# Patient Record
Sex: FEMALE | Race: WHITE | NOT HISPANIC OR LATINO
[De-identification: names, ages, dates, MRNs, and addresses within clinical notes are randomized per-mention and may not be internally consistent; named-entity substitution may affect disease eponyms.]

---

## 2017-12-20 ENCOUNTER — APPOINTMENT (OUTPATIENT)
Dept: ORTHOPEDIC SURGERY | Facility: CLINIC | Age: 77
End: 2017-12-20

## 2018-01-10 ENCOUNTER — APPOINTMENT (OUTPATIENT)
Dept: ORTHOPEDIC SURGERY | Facility: CLINIC | Age: 78
End: 2018-01-10

## 2018-02-28 ENCOUNTER — APPOINTMENT (OUTPATIENT)
Dept: ORTHOPEDIC SURGERY | Facility: CLINIC | Age: 78
End: 2018-02-28

## 2018-11-14 ENCOUNTER — RESULT REVIEW (OUTPATIENT)
Age: 78
End: 2018-11-14

## 2020-12-10 ENCOUNTER — RESULT REVIEW (OUTPATIENT)
Age: 80
End: 2020-12-10

## 2020-12-24 ENCOUNTER — RESULT REVIEW (OUTPATIENT)
Age: 80
End: 2020-12-24

## 2021-01-07 ENCOUNTER — RESULT REVIEW (OUTPATIENT)
Age: 81
End: 2021-01-07

## 2021-02-04 ENCOUNTER — RESULT REVIEW (OUTPATIENT)
Age: 81
End: 2021-02-04

## 2021-11-15 ENCOUNTER — RESULT REVIEW (OUTPATIENT)
Age: 81
End: 2021-11-15

## 2023-01-24 ENCOUNTER — EMERGENCY (EMERGENCY)
Facility: HOSPITAL | Age: 83
LOS: 1 days | Discharge: HOME | End: 2023-01-24
Admitting: EMERGENCY MEDICINE
Payer: MEDICARE

## 2023-01-24 VITALS
DIASTOLIC BLOOD PRESSURE: 76 MMHG | SYSTOLIC BLOOD PRESSURE: 111 MMHG | TEMPERATURE: 98 F | HEART RATE: 80 BPM | RESPIRATION RATE: 16 BRPM | OXYGEN SATURATION: 100 %

## 2023-01-24 PROCEDURE — 73564 X-RAY EXAM KNEE 4 OR MORE: CPT | Mod: 26,LT

## 2023-01-24 PROCEDURE — 99285 EMERGENCY DEPT VISIT HI MDM: CPT | Mod: 25

## 2023-01-24 PROCEDURE — 70450 CT HEAD/BRAIN W/O DYE: CPT | Mod: 26,MA

## 2023-01-24 PROCEDURE — 70486 CT MAXILLOFACIAL W/O DYE: CPT | Mod: 26,MA

## 2023-01-24 PROCEDURE — 12011 RPR F/E/E/N/L/M 2.5 CM/<: CPT

## 2023-01-24 PROCEDURE — 73700 CT LOWER EXTREMITY W/O DYE: CPT | Mod: 26,LT,MA

## 2023-01-24 RX ORDER — TETANUS TOXOID, REDUCED DIPHTHERIA TOXOID AND ACELLULAR PERTUSSIS VACCINE, ADSORBED 5; 2.5; 8; 8; 2.5 [IU]/.5ML; [IU]/.5ML; UG/.5ML; UG/.5ML; UG/.5ML
0.5 SUSPENSION INTRAMUSCULAR ONCE
Refills: 0 | Status: COMPLETED | OUTPATIENT
Start: 2023-01-24 | End: 2023-01-24

## 2023-01-24 RX ADMIN — TETANUS TOXOID, REDUCED DIPHTHERIA TOXOID AND ACELLULAR PERTUSSIS VACCINE, ADSORBED 0.5 MILLILITER(S): 5; 2.5; 8; 8; 2.5 SUSPENSION INTRAMUSCULAR at 12:38

## 2023-01-24 NOTE — ED PROVIDER NOTE - CLINICAL SUMMARY MEDICAL DECISION MAKING FREE TEXT BOX
81 y/o female pmh htn, no blood thinners c/o mechanical trip and fall w/ facial trauma. + 2cm lac to R forehead, minimal tendernes sto R eyebrow. able to ambulate, no hip or pelvis tenderness. WIll obtain CT head/max face, L knee xray, lac repair, reassess 81 y/o female pmh htn, no blood thinners c/o mechanical trip and fall w/ facial trauma. + 2cm lac to R forehead, minimal tendernes sto R eyebrow. able to ambulate, no hip or pelvis tenderness. WIll obtain CT head/max face, L knee xray, lac repair, reassess    Please see progress note for update.

## 2023-01-24 NOTE — CHART NOTE - NSCHARTNOTEFT_GEN_A_CORE
SW made aware patient has been cleared for d/c and needs assistance with transportation. Patient doesn't have medicaid for taxi, SW outreached SW management to possibly order patient a taxi as she doesn't have anybody else to pick her up and doesn't know how to utilize the bus line. SW to remain available and set up taxi if approved.

## 2023-01-24 NOTE — ED PROVIDER NOTE - PROGRESS NOTE DETAILS
PA Adler- Knee xray was suspicious for patellar fracture. CT preformed which shows intraarticular patellar fracture. Spoke with ortho, only requires bulky hamlin and and knee immobilizer and weight bearing as tolerated. Pt can f/u with Dr. Goldstein next week. stable for MS.

## 2023-01-24 NOTE — ED PROVIDER NOTE - CARE PROVIDER_API CALL
Rogelio Goldstein (MD)  Orthopaedic Surgery  [address]  [place] [address]  Phone: [phone]  Fax: [phone]  Follow Up Time:

## 2023-01-24 NOTE — ED PROVIDER NOTE - PATIENT PORTAL LINK FT
You can access the FollowMyHealth Patient Portal offered by [facility] by registering at the following website: [email or web address]. By joining [employer] [facility]’s FollowMyHealth portal, you will also be able to view your health information using other applications (apps) compatible with our system.

## 2023-01-24 NOTE — ED ADULT TRIAGE NOTE - CHIEF COMPLAINT QUOTE
Pt brought in by EMS after a trip and fall hitting her head on the sidewalk. Pt noted to have lac to stacy. Pt denies use of blood thinners.

## 2023-01-24 NOTE — ED PROVIDER NOTE - NSICDXPASTMEDICALHX_GEN_ALL_CORE_FT
PAST MEDICAL HISTORY:  Abdominal mass     Diabetes mellitus type 2  daily fingerstic range 89- 124 mg/dl    H/O: HTN (hypertension)     Hx of hypercholesterolemia

## 2023-01-24 NOTE — ED PROVIDER NOTE - OBJECTIVE STATEMENT
81 y/o female pmh htn c/o mechanical trip and fall x today. Pt tripped on uneven sidewalk and struck her face and L knee on the ground. Pt denies LOC. Pt now c/o L knee pain and laceration to R forehead. Pt denies chest pain, sob, abd pain, n/v/d, numbness, tingling, weakness, dizziness, syncope, fever or chills. Pt is not up to date on tetanus. Denies blood thinner usage

## 2023-02-06 ENCOUNTER — APPOINTMENT (OUTPATIENT)
Dept: ORTHOPEDIC SURGERY | Facility: CLINIC | Age: 83
End: 2023-02-06
Payer: MEDICARE

## 2023-02-06 VITALS — WEIGHT: 172 LBS | BODY MASS INDEX: 27.32 KG/M2 | HEIGHT: 66.5 IN

## 2023-02-06 DIAGNOSIS — M79.18 MYALGIA, OTHER SITE: ICD-10-CM

## 2023-02-06 PROCEDURE — 73564 X-RAY EXAM KNEE 4 OR MORE: CPT | Mod: LT

## 2023-02-06 PROCEDURE — 99204 OFFICE O/P NEW MOD 45 MIN: CPT | Mod: 25

## 2023-02-06 PROCEDURE — 27520 TREAT KNEECAP FRACTURE: CPT

## 2023-02-06 PROCEDURE — L1833: CPT | Mod: LT

## 2023-02-06 NOTE — HISTORY OF PRESENT ILLNESS
[de-identified] : 82 year old female  (RHD, retired )  left knee pain since  1/23/23 while walking tripped and fell went to ER \par The pain is located  anterior, deep \par The pain is associated with  swelling, clicking \par Worse with activity and better at rest.\par Has tried ice, Tylenol \par

## 2023-02-06 NOTE — ASSESSMENT
[FreeTextEntry1] : notes from er reviewed\par xrays reprot  reviewed from er showed quest fx\par Imaging was reviewed and independently interpreted - CT scan left knee 1/24/23 -  minimally disaplced patella fx\par \par Left X-Ray Examination of the KNEE (4 views): nondisplacec patella fx, degenerate changes.\par \par \par \par - We discussed their diagnosis and treatment options at length including the risks and benefits of both surgical treatment with a knee replacement and non-surgical options.\par - given that she can and the risk of surgery we decided will undergo nonop fx care in brace\par - brace locked in ext\par - The patient was advised to modify their activities..\par - Follow up in 6-8 weeks as to re-evaluate with xray for healing and consdier advance activtiy with PT\par \par \par

## 2023-02-06 NOTE — IMAGING
[de-identified] : \par LEFT KNEE\par Inspection:   effusion, \par Palpation: medial joint line tenderness, anterior tenderness over patella\par Knee Range of Motion:  3-125 \par Strength: 5/5 Quadriceps strength, 5/5 Hamstring strength\par Neurological: light touch is intact throughout\par Ligament Stability and Special Tests: \par Able to SLR\par McMurrays: neg\par Lachman: neg\par Pivot Shift: neg\par Posterior Drawer: neg\par Valgus: neg\par Varus: neg\par Patella Apprehension: neg\par Patella Maltracking: neg\par

## 2023-03-16 PROBLEM — S82.002A CLOSED NONDISPLACED FRACTURE OF LEFT PATELLA, UNSPECIFIED FRACTURE MORPHOLOGY, INITIAL ENCOUNTER: Status: ACTIVE | Noted: 2023-02-06

## 2023-03-16 PROBLEM — M17.12 ARTHRITIS OF KNEE, LEFT: Status: ACTIVE | Noted: 2023-02-06

## 2023-03-20 ENCOUNTER — APPOINTMENT (OUTPATIENT)
Dept: ORTHOPEDIC SURGERY | Facility: CLINIC | Age: 83
End: 2023-03-20
Payer: MEDICARE

## 2023-03-20 VITALS — HEIGHT: 66 IN | WEIGHT: 174 LBS | BODY MASS INDEX: 27.97 KG/M2

## 2023-03-20 DIAGNOSIS — M17.12 UNILATERAL PRIMARY OSTEOARTHRITIS, LEFT KNEE: ICD-10-CM

## 2023-03-20 DIAGNOSIS — S82.002A UNSPECIFIED FRACTURE OF LEFT PATELLA, INITIAL ENCOUNTER FOR CLOSED FRACTURE: ICD-10-CM

## 2023-03-20 PROCEDURE — 73560 X-RAY EXAM OF KNEE 1 OR 2: CPT | Mod: LT

## 2023-03-20 PROCEDURE — 99024 POSTOP FOLLOW-UP VISIT: CPT

## 2023-03-20 NOTE — IMAGING
[de-identified] : \par LEFT KNEE\par Inspection:   effusion, \par Palpation: medial joint line tenderness\par Knee Range of Motion:  3-125 \par Strength: 5/5 Quadriceps strength, 5/5 Hamstring strength\par Neurological: light touch is intact throughout\par Ligament Stability and Special Tests: \par Able to SLR\par McMurrays: neg\par Lachman: neg\par Pivot Shift: neg\par Posterior Drawer: neg\par Valgus: neg\par Varus: neg\par Patella Apprehension: neg\par Patella Maltracking: neg\par

## 2023-03-20 NOTE — HISTORY OF PRESENT ILLNESS
[de-identified] : 82 year old female  (RHD, retired )  left knee pain since  1/23/23 while walking tripped and fell went to ER \par The pain is located  anterior, deep \par The pain is associated with  swelling, clicking \par Worse with activity and better at rest.\par Has tried ice, Tylenol \par  \par 3/20/23 - using brace and mod activity, pain improving\par

## 2023-03-20 NOTE — ASSESSMENT
[FreeTextEntry1] : Left X-Ray Examination of the KNEE (2 views):  patella fx with healing, degenerate changes.\par \par \par \par - We discussed their diagnosis and treatment options at length including the risks and benefits of both surgical treatment with a knee replacement and non-surgical options.\par - We will continue conservative treatment with PT, icing, and anti-inflammatory medications.\par - The patient was provided with a prescription for Physical Therapy.\par - The patient was advised to let pain guide the gradual advancement of activities.\par - if arthtiis ascts up in furutre after 8 weesk of PT can consider injs for oa\par \par \par

## 2024-04-10 NOTE — ED PROCEDURE NOTE - PROCEDURE DATE TIME, MLM
EGD RECALL    LAST EGD 3/12/21 IN [facility]    ALLERGIC TO LATEX      LIST OF MEDICATIONS      amphetamine-dextroamphetamine (ADDERALL) 10 MG tablet  buPROPion (WELLBUTRIN) 100 MG tablet  estradiol (VIVELLE-DOT) 0.1 MG/24HR patch  Estradiol 10 MCG insert  hydrOXYzine (ATARAX) 10 MG tablet  irbesartan-hydrochlorothiazide (AVALIDE) 300-12.5 MG tablet  ketoconazole (NIZORAL) 2 % shampoo  lamoTRIgine  MG tablet sustained-release 24 hour  levothyroxine (SYNTHROID, LEVOTHROID) 112 MCG tablet  liothyronine (CYTOMEL) 5 MCG tablet  LORazepam (ATIVAN) 0.5 MG tablet  lurasidone (LATUDA) 40 MG tablet tablet  meloxicam (MOBIC) 15 MG tablet  ondansetron (ZOFRAN) 8 MG tablet  pantoprazole (PROTONIX) 40 MG EC tablet  Vibegron (Gemtesa) 75 MG tablet  zolpidem (AMBIEN) 10 MG tablet      
24-Jan-2023 15:58

## 2025-07-23 NOTE — ED PROVIDER NOTE - THROAT, MLM
On Dyazide 1 capsule daily, losartan 100 Mg daily and amlodipine 5 Mg daily    Orders:    losartan (COZAAR) 100 MG tablet; Take 1 tablet by mouth daily.    triamterene-hydroCHLOROthiazide (MAXZIDE-25) 37.5-25 MG per tablet; Take 1 tablet by mouth daily.    amLODIPine (NORVASC) 5 MG tablet; Take 1 tablet by mouth daily.     uvula midline, no vesicles, no redness, and no oropharyngeal exudate.